# Patient Record
(demographics unavailable — no encounter records)

---

## 2025-03-26 NOTE — ASSESSMENT
[FreeTextEntry1] : 71 yo female with a PMH of HTN, not on any meds, no AC/AP,  presented with acute onset blurry vision and mild HA (now resolved) found to have incidental 2mm right MARYLIN aneurysm, neuro intact.  Today, she arrives for initial HFU.  Feeling well, reports had 4 previous TIA's with work ups at [facility] all negative, then most recently incidental finding of aneurysm on CTA 2/16/25. No deficits, full strength.  Discussed how this is a very small finding and may or may not be a real tiny aneurysm. Management is the same either way as rupture risk with such a small size is very low.    PLAN: MRA head non con - 6 months RTO 6 months

## 2025-03-26 NOTE — HISTORY OF PRESENT ILLNESS
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  Today, she arrives for initial HFU.  Feeling well, reports had 4 previous TIA's with work ups at [facility] all negative, then most recently incidental finding of aneurysm on CTA 2/16/25. No deficits, full strength.   Imaging below copied from hospital course: RADIOLOGY: < from: CT Angio Brain Stroke Protocol w/ IV Cont (02.16.25 @ 17:04) >  IMPRESSION:  CT HEAD: Gray/white matter differentiation is preserved. No acute intracranial hemorrhage.  CT PERFUSION: Technical limitations: Limited by mild patient motion in all 3 planes during image acquisition and nonoptimized arterial waveform imaging. Core infarction: 0 ml Penumbra / tissue at risk for active ischemia: 0 ml  CTA NECK: 1. No evidence of significant stenosis or occlusion in association with bilateral common carotid arteries or bilateral internal carotid arteries.. 2. Hypoplastic but patent right vertebral artery. There is clinical concern for dissection, consider further evaluation via MRA imaging of the neck to include dissection protocol (axial T1-weighted imaging with fat saturation technique). 3. Left vertebral artery is patent.  CTA HEAD: 1. No large vessel occlusion. 2. Bilateral P1 segments are hypoplastic. There are relative fetal origins of the bilateral posterior cerebral arteries. 3. Question small right anterior communicating artery aneurysm measuring approximately 2 mm in diameter. Attention on follow-up.

## 2025-03-26 NOTE — PHYSICAL EXAM
[General Appearance - Alert] : alert [General Appearance - In No Acute Distress] : in no acute distress [Oriented To Time, Place, And Person] : oriented to person, place, and time [Impaired Insight] : insight and judgment were intact [Sclera] : the sclera and conjunctiva were normal [Hearing Threshold Finger Rub Not [place]] : hearing was normal [Neck Appearance] : the appearance of the neck was normal [] : no respiratory distress [Abnormal Walk] : normal gait [Involuntary Movements] : no involuntary movements were seen [Motor Tone] : muscle strength and tone were normal [Skin Color & Pigmentation] : normal skin color and pigmentation

## 2025-03-26 NOTE — HISTORY OF PRESENT ILLNESS
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  Today, she arrives for initial HFU.  Feeling well, reports had 4 previous TIA's with work ups at [facility] all negative, then most recently incidental finding of aneurysm on CTA 2/16/25. No deficits, full strength.   Imaging below copied from hospital course: RADIOLOGY: < from: CT Angio Brain Stroke Protocol w/ IV Cont (02.16.25 @ 17:04) >  IMPRESSION:  CT HEAD: Gray/white matter differentiation is preserved. No acute intracranial hemorrhage.  CT PERFUSION: Technical limitations: Limited by mild patient motion in all 3 planes during image acquisition and nonoptimized arterial waveform imaging. Core infarction: 0 ml Penumbra / tissue at risk for active ischemia: 0 ml  CTA NECK: 1. No evidence of significant stenosis or occlusion in association with bilateral common carotid arteries or bilateral internal carotid arteries.. 2. Hypoplastic but patent right vertebral artery. There is clinical concern for dissection, consider further evaluation via MRA imaging of the neck to include dissection protocol (axial T1-weighted imaging with fat saturation technique). 3. Left vertebral artery is patent.  CTA HEAD: 1. No large vessel occlusion. 2. Bilateral P1 segments are hypoplastic. There are relative fetal origins of the bilateral posterior cerebral arteries. 3. Question small right anterior communicating artery aneurysm measuring approximately 2 mm in diameter. Attention on follow-up.

## 2025-03-26 NOTE — PHYSICAL EXAM
[General Appearance - Alert] : alert [General Appearance - In No Acute Distress] : in no acute distress [Oriented To Time, Place, And Person] : oriented to person, place, and time [Impaired Insight] : insight and judgment were intact [Sclera] : the sclera and conjunctiva were normal [Hearing Threshold Finger Rub Not [place]] : hearing was normal [Neck Appearance] : the appearance of the neck was normal [] : no respiratory distress [Abnormal Walk] : normal gait [Involuntary Movements] : no involuntary movements were seen [Motor Tone] : muscle strength and tone were normal [Skin Color & Pigmentation] : normal skin color and pigmentation

## 2025-03-26 NOTE — ASSESSMENT
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  Today, she arrives for initial HFU.  Feeling well, reports had 4 previous TIA's with work ups at [facility] all negative, then most recently incidental finding of aneurysm on CTA 2/16/25. No deficits, full strength.  Discussed how this is a very small finding and may or may not be a real tiny aneurysm. Management is the same either way as rupture risk with such a small size is very low.    PLAN: MRA head non con - 6 months RTO 6 months